# Patient Record
Sex: MALE | URBAN - METROPOLITAN AREA
[De-identification: names, ages, dates, MRNs, and addresses within clinical notes are randomized per-mention and may not be internally consistent; named-entity substitution may affect disease eponyms.]

---

## 2024-07-01 ENCOUNTER — APPOINTMENT (RX ONLY)
Dept: URBAN - METROPOLITAN AREA CLINIC 12 | Facility: CLINIC | Age: 64
Setting detail: DERMATOLOGY
End: 2024-07-01

## 2024-07-01 DIAGNOSIS — L663 OTHER SPECIFIED DISEASES OF HAIR AND HAIR FOLLICLES: ICD-10-CM

## 2024-07-01 DIAGNOSIS — L82.1 OTHER SEBORRHEIC KERATOSIS: ICD-10-CM

## 2024-07-01 DIAGNOSIS — L29.8 OTHER PRURITUS: ICD-10-CM

## 2024-07-01 DIAGNOSIS — L29.89 OTHER PRURITUS: ICD-10-CM

## 2024-07-01 DIAGNOSIS — L738 OTHER SPECIFIED DISEASES OF HAIR AND HAIR FOLLICLES: ICD-10-CM

## 2024-07-01 DIAGNOSIS — L73.9 FOLLICULAR DISORDER, UNSPECIFIED: ICD-10-CM

## 2024-07-01 PROBLEM — L02.821 FURUNCLE OF HEAD [ANY PART, EXCEPT FACE]: Status: ACTIVE | Noted: 2024-07-01

## 2024-07-01 PROCEDURE — 99204 OFFICE O/P NEW MOD 45 MIN: CPT

## 2024-07-01 PROCEDURE — ? PRESCRIPTION

## 2024-07-01 PROCEDURE — ? COUNSELING

## 2024-07-01 PROCEDURE — ? TREATMENT REGIMEN

## 2024-07-01 RX ORDER — DOXYCYCLINE HYCLATE 20 MG
TABLET ORAL
Qty: 90 | Refills: 0 | Status: ERX | COMMUNITY
Start: 2024-07-01

## 2024-07-01 RX ADMIN — Medication: at 00:00

## 2024-07-01 ASSESSMENT — LOCATION ZONE DERM
LOCATION ZONE: FACE
LOCATION ZONE: FACE
LOCATION ZONE: SCALP

## 2024-07-01 ASSESSMENT — LOCATION SIMPLE DESCRIPTION DERM
LOCATION SIMPLE: POSTERIOR SCALP
LOCATION SIMPLE: LEFT CHEEK
LOCATION SIMPLE: LEFT CHEEK

## 2024-07-01 ASSESSMENT — LOCATION DETAILED DESCRIPTION DERM
LOCATION DETAILED: LEFT SUPERIOR LATERAL BUCCAL CHEEK
LOCATION DETAILED: POSTERIOR MID-PARIETAL SCALP
LOCATION DETAILED: LEFT CENTRAL MALAR CHEEK
LOCATION DETAILED: LEFT CENTRAL MALAR CHEEK

## 2024-07-01 NOTE — PROCEDURE: TREATMENT REGIMEN
Samples Given: CLN shampoo for folliculitis-prone scalp
Initiate Treatment: Doxycycline 20mg BID
Detail Level: Simple
Plan: Start treatment for folliculitis as above, but symptoms are concerning for neuropathic pruritus. RTC if not improved with treatment of folliculitis in addition to Sarna/CeraVe anti-itch
Samples Given: Sarna lotion, CeraVe anti-itch
3